# Patient Record
Sex: MALE | Race: WHITE | NOT HISPANIC OR LATINO | URBAN - METROPOLITAN AREA
[De-identification: names, ages, dates, MRNs, and addresses within clinical notes are randomized per-mention and may not be internally consistent; named-entity substitution may affect disease eponyms.]

---

## 2018-06-12 ENCOUNTER — OFFICE VISIT - HEALTHEAST (OUTPATIENT)
Dept: UROLOGY | Facility: CLINIC | Age: 63
End: 2018-06-12

## 2018-06-12 ENCOUNTER — COMMUNICATION - HEALTHEAST (OUTPATIENT)
Dept: UROLOGY | Facility: CLINIC | Age: 63
End: 2018-06-12

## 2018-06-12 DIAGNOSIS — N20.1 CALCULUS OF URETER: ICD-10-CM

## 2018-06-12 DIAGNOSIS — N13.2 HYDRONEPHROSIS WITH URINARY OBSTRUCTION DUE TO URETERAL CALCULUS: ICD-10-CM

## 2018-06-12 RX ORDER — PENICILLIN V POTASSIUM 500 MG/1
TABLET, FILM COATED ORAL
Refills: 0 | Status: SHIPPED | COMMUNITY
Start: 2018-06-06

## 2018-06-12 RX ORDER — IBUPROFEN 600 MG/1
TABLET, FILM COATED ORAL
Refills: 1 | Status: SHIPPED | COMMUNITY
Start: 2018-06-06

## 2018-06-14 ENCOUNTER — COMMUNICATION - HEALTHEAST (OUTPATIENT)
Dept: UROLOGY | Facility: CLINIC | Age: 63
End: 2018-06-14

## 2021-03-09 ENCOUNTER — RECORDS - HEALTHEAST (OUTPATIENT)
Dept: LAB | Facility: CLINIC | Age: 66
End: 2021-03-09

## 2021-03-09 LAB
APPEARANCE FLD: ABNORMAL
COLOR FLD: YELLOW
CRYSTALS SNV MICRO: NORMAL
EOSINOPHIL NFR FLD MANUAL: ABNORMAL %
LYMPHOCYTES NFR FLD MANUAL: 4 %
MACROPHAGE % - HISTORICAL: ABNORMAL
MESOTHELIALS, FLUID: ABNORMAL
MONOCYTE % - HISTORICAL: 19 %
NEUTS BAND NFR FLD MANUAL: 77 %
OTHER CELLS FLD MANUAL: ABNORMAL
RBC FLUID - HISTORICAL: ABNORMAL /UL
WBC # FLD AUTO: 1037 /UL (ref 0–99)

## 2021-03-12 LAB
BACTERIA SPEC CULT: NO GROWTH
BACTERIA SPEC CULT: NORMAL
GRAM STAIN RESULT: NORMAL
GRAM STAIN RESULT: NORMAL

## 2021-06-18 NOTE — PROGRESS NOTES
Assessment/Plan:        Diagnoses and all orders for this visit:    Calculus of ureter  -     Symptom Control While Passing a Stone Education  -     CT Abdomen Pelvis Without Oral Without IV Contrast; Future; Expected date: 6/26/18  -     Patient Stated Goal: Pass my stone    Hydronephrosis with urinary obstruction due to ureteral calculus    Other orders  -     penicillin VK (PEN VK) 500 MG tablet; TAKE 1 TABELT BY MOUTH 4 TIMES A DAY UNTIL GONE.; Refill: 0  -     ibuprofen (ADVIL,MOTRIN) 600 MG tablet; TAKE 1 TABLET BY MOUTH 4 TIMES A DAY AS NEEDED FOR PAIN; Refill: 1      Stone Management Plan  [facility] 6/12/2018   Urinary Tract Infection No suspicion of infection   Renal Colic Well controlled symptoms   Renal Failure No suspicion of renal failure   Current CT date 6/12/2018   Right sided stones? No   R Stone Event No current event   Left sided stones? Yes   L Number of ureteral stones 1   L GSD of ureteral stones 3   L Location of ureteral stone Distal   L Number of kidney stones  No renal stones   L GSD of kidney stones N/A   L Hydronephrosis Mild   L Stone Event New event   Diagnosis date 6/12/2018   Initial location of primary symptomatic stone Distal   Initial GSD of primary symptomatic stone 3   L MET Status Initiation   L Current Plan MET   MET 2 week F/U             Subjective:      HPI  Mr. Sarah Wasserman is a 62 y.o.  male presenting to the [facility] [place] following [place]'s ED visit earlier today for urolithiasis.    He is a first time unidentified composition stone former who has not required stone clearance procedures. He has not previously participated in stone risk evaluation. He has identified modifiable stone risks including:  gout. He has identified non-modifiable stone risks including:  limited family history.    Primary symptom at presentation was acute onset left lower abdominal pain x several minutes. It radiated into the left lower back. The pain  lasted for 15-20 minutes and then abated only to return a few hours later. It was constant with no associated alleviating or aggravating factors. No similar pain events in the past. Significant associated symptoms at presentation included:  nausea. Evaluation in ED today was notable for CT confirming an obstructing left distal ureteral stone. He was discharged home following resolution of his symptoms. He was given prescriptions for oxycodone and zofran.    Significant current symptoms include:  Fluctuant mild left abdominal pain. Pertinent negative current symptoms include:  fever, chills, right flank pain, nausea, vomiting, hematuria, urinary frequency and dysuria.     CT scan from today is personally reviewed and demonstrates a mildly obstructing 3 mm left distal ureteral stone.    Significant labs from presentation include moderate hematuria, no pyuria, negative nitrite, no bacteria, normal WBC, slightly elevated creatinine and normal potassium.    PLAN    63 yo M first time stone former with newly diagnosed obstructive left urolithiasis, mild left abdominal pain.    Will proceed with medical expulsive therapy. Risks and benefits were detailed of medical expulsive therapy including probability of stone passage, recurrent renal colic, and requirement of emergency medical and/or surgical care and further imaging. Patient verbalized understanding. Patient agrees with plan as discussed. He will return in 2 weeks with low dose CT scan.    For symptom control, he was prescribed oxycodone and ondansetron. Over the counter symptom control medications of ibuprofen, Dramamine and Tylenol were recommended.     ROS   Review of Systems  A 12 point comprehensive review of systems is negative except for HPI    Past Medical History:   Diagnosis Date     Gout      Kidney stone        Past Surgical History:   Procedure Laterality Date     NO PAST SURGERIES         Current Outpatient Prescriptions   Medication Sig Dispense Refill      ibuprofen (ADVIL,MOTRIN) 600 MG tablet TAKE 1 TABLET BY MOUTH 4 TIMES A DAY AS NEEDED FOR PAIN  1     penicillin VK (PEN VK) 500 MG tablet TAKE 1 TABELT BY MOUTH 4 TIMES A DAY UNTIL GONE.  0     acetaminophen (TYLENOL) 500 MG tablet Take 2 tablets (1,000 mg total) by mouth every 6 (six) hours as needed for pain. 56 tablet 0     dimenhyDRINATE (DRAMAMINE) 50 MG tablet Take 1 tablet (50 mg total) by mouth every 8 (eight) hours as needed (kidney stone pain). 28 tablet 0     ibuprofen (ADVIL,MOTRIN) 200 MG tablet Take 2 tablets (400 mg total) by mouth every 6 (six) hours as needed for pain. 56 tablet 0     ondansetron (ZOFRAN ODT) 4 MG disintegrating tablet Take 1 tablet (4 mg total) by mouth every 8 (eight) hours as needed for nausea. 10 tablet 0     oxyCODONE (ROXICODONE) 5 MG immediate release tablet Every 4-6 hours as needed if pain is not improved with acetaminophen and ibuprofen. 20 tablet 0     No current facility-administered medications for this visit.        No Known Allergies    Social History     Social History     Marital status:      Spouse name: N/A     Number of children: N/A     Years of education: N/A     Occupational History     Finance      Social History Main Topics     Smoking status: Never Smoker     Smokeless tobacco: Never Used     Alcohol use Yes      Comment: Occasional     Drug use: No     Sexual activity: Not on file     Other Topics Concern     Not on file     Social History Narrative       Family History   Problem Relation Age of Onset     Urolithiasis Father      Prostate cancer Father      Heart disease Father      Clotting disorder Neg Hx      Diabetes Neg Hx      Gout Neg Hx        Objective:      Physical Exam  Vitals:    06/12/18 0947   BP: 122/79   Pulse: 70   Temp: 97.6  F (36.4  C)     General - well developed, well nourished, appropriate for age. Appears fatigued   Heart - regular rate and rhythm, no murmur  Respiratory - normal effort, clear to auscultation, good air  entry without adventitious noises  Abdomen - slender soft, non-tender, no hepatosplenomegaly, no masses.   - no flank tenderness, no suprapubic tenderness, kidney and bladder non-palpable  MSK - normal spinal curvature. no spinal tenderness. normal gait. muscular strength intact.  Neurology - cranial nerves II-XII grossly intact, normal sensation, no unsteadiness  Skin - intact, no bruising, no gouty tophi  Psych - oriented to time, place, and person, normal mood and affect.    Labs  Urinalysis POC (Office):  Nitrite, UA   Date Value Ref Range Status   06/12/2018 Negative Negative Final       Lab Urinalysis:  Blood, UA   Date Value Ref Range Status   06/12/2018 Moderate (!) Negative Final     Nitrite, UA   Date Value Ref Range Status   06/12/2018 Negative Negative Final     Leukocytes, UA   Date Value Ref Range Status   06/12/2018 Negative Negative Final     pH, UA   Date Value Ref Range Status   06/12/2018 7.5 4.5 - 8.0 Final    and Acute Labs   CBC   WBC   Date Value Ref Range Status   06/12/2018 7.4 4.0 - 11.0 thou/uL Final     Hemoglobin   Date Value Ref Range Status   06/12/2018 15.6 14.0 - 18.0 g/dL Final     Platelets   Date Value Ref Range Status   06/12/2018 208 140 - 440 thou/uL Final    and Renal Panel  [facility]  Creatinine   Date Value Ref Range Status   06/12/2018 1.44 (H) 0.70 - 1.30 mg/dL Final     Potassium   Date Value Ref Range Status   06/12/2018 4.0 3.5 - 5.0 mmol/L Final     Calcium   Date Value Ref Range Status   06/12/2018 9.7 8.5 - 10.5 mg/dL Final